# Patient Record
Sex: FEMALE
[De-identification: names, ages, dates, MRNs, and addresses within clinical notes are randomized per-mention and may not be internally consistent; named-entity substitution may affect disease eponyms.]

---

## 2019-03-14 ENCOUNTER — HOSPITAL ENCOUNTER (OUTPATIENT)
Dept: HOSPITAL 5 - TRG | Age: 24
Discharge: HOME | End: 2019-03-14
Attending: OBSTETRICS & GYNECOLOGY
Payer: COMMERCIAL

## 2019-03-14 VITALS — SYSTOLIC BLOOD PRESSURE: 97 MMHG | DIASTOLIC BLOOD PRESSURE: 56 MMHG

## 2019-03-14 DIAGNOSIS — O26.853: ICD-10-CM

## 2019-03-14 DIAGNOSIS — Z3A.32: ICD-10-CM

## 2019-03-14 DIAGNOSIS — O26.893: Primary | ICD-10-CM

## 2019-03-14 DIAGNOSIS — F41.9: ICD-10-CM

## 2019-03-14 DIAGNOSIS — O99.343: ICD-10-CM

## 2019-03-14 DIAGNOSIS — R10.9: ICD-10-CM

## 2019-03-14 DIAGNOSIS — Z87.891: ICD-10-CM

## 2019-03-14 LAB
BACTERIA #/AREA URNS HPF: (no result) /HPF
BENZODIAZEPINES SCREEN,URINE: (no result)
BILIRUB UR QL STRIP: (no result)
BLOOD UR QL VISUAL: (no result)
METHADONE SCREEN,URINE: (no result)
MUCOUS THREADS #/AREA URNS HPF: (no result) /HPF
OPIATE SCREEN,URINE: (no result)
PH UR STRIP: 6 [PH] (ref 5–7)
RBC #/AREA URNS HPF: 26 /HPF (ref 0–6)
UROBILINOGEN UR-MCNC: < 2 MG/DL (ref ?–2)
WBC #/AREA URNS HPF: > 182 /HPF (ref 0–6)

## 2019-03-14 PROCEDURE — 96372 THER/PROPH/DIAG INJ SC/IM: CPT

## 2019-03-14 PROCEDURE — 76805 OB US >/= 14 WKS SNGL FETUS: CPT

## 2019-03-14 PROCEDURE — 96360 HYDRATION IV INFUSION INIT: CPT

## 2019-03-14 PROCEDURE — 80307 DRUG TEST PRSMV CHEM ANLYZR: CPT

## 2019-03-14 PROCEDURE — 96361 HYDRATE IV INFUSION ADD-ON: CPT

## 2019-03-14 PROCEDURE — 59025 FETAL NON-STRESS TEST: CPT

## 2019-03-14 PROCEDURE — 81001 URINALYSIS AUTO W/SCOPE: CPT

## 2019-03-14 RX ADMIN — TERBUTALINE SULFATE SCH MG: 1 INJECTION SUBCUTANEOUS at 05:00

## 2019-03-14 RX ADMIN — TERBUTALINE SULFATE SCH MG: 1 INJECTION SUBCUTANEOUS at 05:33

## 2019-03-14 RX ADMIN — TERBUTALINE SULFATE SCH MG: 1 INJECTION SUBCUTANEOUS at 06:12

## 2019-03-14 NOTE — ULTRASOUND REPORT
PROCEDURE: US OB >= 14 WEEKS FETUS 

 

TECHNIQUE:  Real-time transabdominal sonography of the uterus, placenta, amniotic fluid, adnexa, and 
fetus was performed with image documentation. Measurements were obtained to determine fetal age/size.
 M-mode Doppler was used to document fetal heartbeat.  

 

ADDITIONAL GESTATION: None 

 

HISTORY:  contractions 

 

COMPARISONS:  None. 

 

FINDINGS: 

 

MATERNAL: 

Uterus and cervix: The cervix is closed measures  3.7 cm in length. 

Adnexa and ovaries: Not visualized. 

 

IUP:  Single live intrauterine gestation.   

Fetal Position:  Cephalic 

Placental position:  Posterior, without previa .   

Amniotic fluid volume Normal. CELESTINE is 17.1 cm. 

Cardiac activity: Heart rate was not recorded. 

 

FETAL ANATOMY: 

Face/lips/nose: Normal. 

Cerebral ventricles: Normal. 

Cisterna magna/cerebellum: Normal. 

Heart: Normal. 

Stomach: Normal. 

Umbilical cord: 3 vessel umbilical cord with normal insertion. 

Kidneys: Normal. 

Bladder: Normal. 

Spine: Normal. 

Extremities: Normal. 

 

FETAL BIOMETRY: 

Biparietal diameter:  8.4 cm corresponding to 34 weeks. 

Head circumference:  30 cm corresponding to 33 weeks and 2 days. 

abdominal circumference:  28 cm corresponding to 32 weeks. 

Femur length:  6.2 cm corresponding to 32 weeks 1 day. 

Fetal Ratio biometry: Normal . 

Estimated Fetal Weight:  1955 grams +/- 289  grams. 

 

Mean Gestational Age (composite criteria) based on today's measurements:  32 weeks and 6 days.   

 

Estimated Due Date (earliest scan):  5/3/2019. 

 

IMPRESSION:   

 

Single live intrauterine gestation at 32 weeks and 6 days.  Estimated due date:  3/5/2019. 

 

There is no evidence of placenta previa or abruption. 

 

This document is electronically signed by Houston Dugan MD., 2019 04:28:15 AM ET

## 2019-03-14 NOTE — EVENT NOTE
Date: 19





23 y.o.  IUP at approx 32 weeks by EDC given by patient upon arrival. 

Patient presents to triage with c/o abdominal pain and pressure. Patient reports

"like contractions, but it's not contractions". Patient reports noting "small 

brown spotting" prior to arrival to triage. She reports +FM, denies LOF. Abdomen

palpated soft, non tender. TOCO shows irritability, with some irregular mild 

contractions. Category 1 tracing of FHTs. Patient reports a hx of , 

uncomplicated, 39 wks in 2016 and "emergency c/s for short cervix, Preeclampsia,

and abruption when I was 4 months pregnant" in 2017. She reports Recent 

incarceration from [place], 2019 until 2 weeks ago. Her only prenatal care she 

received was in [place] while incarcerated. She reports being inpatient in 

[facility] in [place] 2 weeks ago for contractions/ labor. Patient reports

receiving IV mag infusion, steroids, Terbutaline, IV fluids. She states that the

labor stopped and she was discharged with SVE 3 cm. Per triage RN, SVE remains 3

cm at this time. Records obtained from [place] confirm 3 cm SVE as inpatient 

there and BMZ x1, patient signed out AMA prior to 2nd dose. Terb series to be 

given now, along with continued IV fluids. Will give one dose of BMZ at this 

time per Dr. Pereira. Will continue to monitor at this time.